# Patient Record
Sex: FEMALE | Race: WHITE | Employment: FULL TIME | URBAN - METROPOLITAN AREA
[De-identification: names, ages, dates, MRNs, and addresses within clinical notes are randomized per-mention and may not be internally consistent; named-entity substitution may affect disease eponyms.]

---

## 2021-06-30 ENCOUNTER — HOSPITAL ENCOUNTER (EMERGENCY)
Age: 66
Discharge: HOME | End: 2021-06-30
Payer: COMMERCIAL

## 2021-06-30 VITALS
WEIGHT: 175 LBS | OXYGEN SATURATION: 95 % | HEART RATE: 67 BPM | RESPIRATION RATE: 16 BRPM | BODY MASS INDEX: 29.88 KG/M2 | TEMPERATURE: 97.1 F | SYSTOLIC BLOOD PRESSURE: 150 MMHG | HEIGHT: 64 IN | DIASTOLIC BLOOD PRESSURE: 80 MMHG

## 2021-06-30 DIAGNOSIS — I88.9 LYMPHADENITIS: ICD-10-CM

## 2021-06-30 DIAGNOSIS — J02.9 SORE THROAT: Primary | ICD-10-CM

## 2021-06-30 PROCEDURE — 99283 EMERGENCY DEPT VISIT LOW MDM: CPT

## 2021-06-30 RX ORDER — LEVOTHYROXINE SODIUM 0.12 MG/1
125 TABLET ORAL DAILY
COMMUNITY

## 2021-06-30 RX ORDER — HYDROCHLOROTHIAZIDE 25 MG/1
25 TABLET ORAL DAILY
COMMUNITY

## 2021-06-30 RX ORDER — AMOXICILLIN 500 MG/1
500 CAPSULE ORAL 2 TIMES DAILY
Qty: 20 CAPSULE | Refills: 0 | Status: SHIPPED | OUTPATIENT
Start: 2021-06-30 | End: 2021-07-10

## 2021-06-30 ASSESSMENT — PAIN DESCRIPTION - PROGRESSION: CLINICAL_PROGRESSION: GRADUALLY WORSENING

## 2021-06-30 ASSESSMENT — PAIN SCALES - GENERAL: PAINLEVEL_OUTOF10: 7

## 2021-06-30 ASSESSMENT — PAIN - FUNCTIONAL ASSESSMENT: PAIN_FUNCTIONAL_ASSESSMENT: PREVENTS OR INTERFERES SOME ACTIVE ACTIVITIES AND ADLS

## 2021-06-30 ASSESSMENT — PAIN DESCRIPTION - LOCATION: LOCATION: THROAT

## 2021-06-30 ASSESSMENT — PAIN DESCRIPTION - DESCRIPTORS: DESCRIPTORS: DISCOMFORT

## 2021-06-30 ASSESSMENT — PAIN DESCRIPTION - PAIN TYPE: TYPE: ACUTE PAIN

## 2021-06-30 ASSESSMENT — PAIN DESCRIPTION - ONSET: ONSET: ON-GOING

## 2021-06-30 ASSESSMENT — PAIN DESCRIPTION - FREQUENCY: FREQUENCY: INTERMITTENT

## 2021-07-01 NOTE — ED PROVIDER NOTES
[address]  Department of Emergency Medicine   ED  Encounter Note  Admit Date/RoomTime: 2021 12:06 PM  ED Room: [facility]LEX/ALEX      NAME: Salvador Rashid  : 1955  MRN: [id number]     Chief Complaint:  Other (swollen gland left jaw area, also sore throat and earache.)    History of Present Illness      Ed Gay is a 63 y.o. old female who presents to the emergency department by private vehicle, for sore throat and swelling of left lymph nodes x2 days. Patient states her symptoms are mild in severity describes as an aching pain. Patient denies any make it better or worse. Denies fever/chills, headache, vision change, dizziness, cough, hemoptysis, chest pain, dyspnea, abdominal pain, NVD, numbness/weakness. ROS   Pertinent positives and negatives are stated within HPI, all other systems reviewed and are negative. Past Medical History:  has a past medical history of Hypertension and Thyroid disease. Surgical History:  has no past surgical history on file. Social History:  reports that she has been smoking cigarettes. She has never used smokeless tobacco. She reports that she does not drink alcohol and does not use drugs. Family History: family history includes Breast Cancer in her none; Ovarian Cancer in her mother; Pancreatic Cancer in her paternal grandmother. Allergies: Morphine and related    Physical Exam   Oxygen Saturation Interpretation: Normal.        ED Triage Vitals [21 1142]   BP Temp Temp Source Pulse Resp SpO2 Height Weight   (!) 150/80 97.1 °F (36.2 °C) Temporal 67 16 95 % 5' 4\" (1.626 m) 175 lb (79.4 kg)         Constitutional:  Alert, development consistent with age. .  Ears:  TMs without perforation, injection, or bulging. External canals clear without exudate. Throat: Airway Patent. mild erythema. Neck/Lymphatic:  Supple. There is Bilateral anterior cervical node tenderness worse on the left.   respiratory:  Clear to auscultation and breath sounds equal.    CV: Regular rate and rhythm, normal heart sounds, without pathological murmurs, ectopy, gallops, or rubs. GI:  Abdomen Soft, nontender, good bowel sounds. No firm or pulsatile mass. Inegument:  No rashes, erythema present. Neurological:  Oriented. Motor functions intact. Lab / Imaging Results   (All laboratory and radiology results have been personally reviewed by myself)  Labs:  No results found for this visit on 06/30/21. Imaging: All Radiology results interpreted by Radiologist unless otherwise noted. No orders to display       ED Course / Medical Decision Making   Medications - No data to display     Consult(s):   None    Procedure(s):   none    MDM:   Patient presenting with sore throat and swollen lymph node. Patient is in no acute distress, afebrile, nontoxic appearance. Patient was started on amoxicillin. Recommend patient follow-up with PCP. Recommend patient return to the ED with new or worsening of symptoms. Plan of Care/Counseling:  KEITH Bush reviewed today's visit with the patient in addition to providing specific details for the plan of care and counseling regarding the diagnosis and prognosis. Questions are answered at this time and are agreeable with the plan. Assessment     1. Sore throat    2. Lymphadenitis      Plan   Discharged home. Patient condition is stable    New Medications     Discharge Medication List as of 6/30/2021 12:59 PM      START taking these medications    Details   amoxicillin (AMOXIL) 500 MG capsule Take 1 capsule by mouth 2 times daily for 10 days, Disp-20 capsule, R-0Print           Electronically signed by KEITH Bush   DD: 6/30/21  **This report was transcribed using voice recognition software. Every effort was made to ensure accuracy; however, inadvertent computerized transcription errors may be present.   END OF ED PROVIDER NOTE     KEITH Bush  06/30/21 [id number]